# Patient Record
Sex: FEMALE | Race: WHITE
[De-identification: names, ages, dates, MRNs, and addresses within clinical notes are randomized per-mention and may not be internally consistent; named-entity substitution may affect disease eponyms.]

---

## 2022-07-10 ENCOUNTER — HOSPITAL ENCOUNTER (EMERGENCY)
Dept: HOSPITAL 75 - ER | Age: 42
Discharge: LEFT BEFORE DISCHARGE | End: 2022-07-10
Payer: SELF-PAY

## 2022-07-10 VITALS — SYSTOLIC BLOOD PRESSURE: 126 MMHG | DIASTOLIC BLOOD PRESSURE: 80 MMHG

## 2022-07-10 VITALS — WEIGHT: 218.26 LBS | HEIGHT: 72 IN | BODY MASS INDEX: 29.56 KG/M2

## 2022-07-10 DIAGNOSIS — Z20.822: ICD-10-CM

## 2022-07-10 DIAGNOSIS — R06.02: ICD-10-CM

## 2022-07-10 DIAGNOSIS — F17.200: ICD-10-CM

## 2022-07-10 DIAGNOSIS — R19.7: Primary | ICD-10-CM

## 2022-07-10 DIAGNOSIS — R53.1: ICD-10-CM

## 2022-07-10 PROCEDURE — 87636 SARSCOV2 & INF A&B AMP PRB: CPT

## 2022-07-10 PROCEDURE — 99283 EMERGENCY DEPT VISIT LOW MDM: CPT

## 2022-07-10 NOTE — ED GENERAL
General


Chief Complaint:  COVID19 Suspect/Confirmed


Stated Complaint:  N/V/D - WEAKNESS - SOA - COUGH - CONGESTION


Nursing Triage Note:  


ARRIVED VIA AMB TO ROOM 06 WITH COMPLAINTS OF SOA, WEAKNESS, AND DIARRHEA 


STARTING ON FRIDAY.  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 


PTS.





History of Present Illness


Date Seen by Provider:  Jul 10, 2022


Time Seen by Provider:  08:26


Initial Comments


Chief complaint and report from nurse was reviewed.  COVID-19 and influenza 

testing was ordered.  Results were communicated to the patient.  She decided not

to wait for evaluation by physician and left without being examined.  Patient 

was observed by me to be ambulatory and walked to the bathroom multiple times 

during her stay here.  She was not in any distress.





Allergies and Home Medications


Allergies


Coded Allergies:  


     No Known Allergies (Verified  Allergy, Unknown, 2/26/06)





Patient Home Medication List


Home Medication List Reviewed:  Yes


Azithromycin (Zithromax Tab) 250 Mg Tab, 1 TAB PO DAILY


   Prescribed by: KASEY GONZALEZ on 1/10/14 1851


Naproxen (Naproxen) 250 Mg Tablet, 1 EA PO TID PRN for PAIN


   Prescribed by: KASEY GONZALEZ on 1/10/14 1851





Review of Systems


Review of Systems


Constitutional:  see HPI


EENTM:  no symptoms reported


Respiratory:  see HPI


Cardiovascular:  no symptoms reported


Gastrointestinal:  see HPI


Genitourinary:  no symptoms reported


Pregnant:  No





Past Medical-Social-Family Hx


Patient Social History


Tobacco Use?:  Yes


Smoking Status:  Current Everyday Smoker


Substance use?:  Yes


Substance type:  Marijuana


Alcohol Use?:  No





Immunizations Up To Date


Second COVID19 Vaccination Lewis:  UNKNOWN DATE


COVID19 Vaccine :  ROBER





Past Medical History


Adverse Reaction/Blood Tranf:  No





Physical Exam


Vital Signs





Vital Signs - First Documented








 7/10/22





 08:25


 


Temp 36.4


 


Pulse 74


 


Resp 16


 


B/P (MAP) 126/80 (95)


 


Pulse Ox 98


 


O2 Delivery Room Air





Capillary Refill : Less Than 3 Seconds


Height, Weight, BMI


Height: '"


Weight: 290lbs. oz. [id number]; 28.00 BMI


Method:Stated


General Appearance:  No Apparent Distress





Progress/Results/Core Measures


Suspected Sepsis


SIRS


Temperature: 


Pulse: 74 


Respiratory Rate: 16


 


Blood Pressure 126 /80 


Mean: 95





Results/Orders


Lab Results





Laboratory Tests








Test


 7/10/22


08:30 Range/Units


 


 


Influenza Type A (RT-PCR) Not Detected  Not Detecte  


 


Influenza Type B (RT-PCR) Not Detected  Not Detecte  


 


SARS-CoV-2 RNA (RT-PCR) Not Detected  Not Detecte  








My Orders





Orders - JOEY ARMSTRONG MD


Covid 19 Inhouse Test (7/10/22 08:26)


Influenza A And B By Pcr (7/10/22 08:26)





Vital Signs/I&O











 7/10/22





 08:25


 


Temp 36.4


 


Pulse 74


 


Resp 16


 


B/P (MAP) 126/80 (95)


 


Pulse Ox 98


 


O2 Delivery Room Air





Capillary Refill : Less Than 3 Seconds


2





Blood Pressure Mean:                    95











Departure


Impression





   Primary Impression:  


   Diarrhea


   Qualified Codes:  R19.7 - Diarrhea, unspecified


   Additional Impressions:  


   Shortness of breath


   Weakness


   Patient left without being seen


Disposition:  07 AGAINST MEDICAL ADVICE


Condition:  Against Medical Advice





Departure-Patient Inst.


Referrals:  


[facility] (PCP)


Primary Care Physician











JOEY ARMSTRONG MD        Jul 10, 2022 11:58